# Patient Record
Sex: MALE | Race: WHITE | URBAN - METROPOLITAN AREA
[De-identification: names, ages, dates, MRNs, and addresses within clinical notes are randomized per-mention and may not be internally consistent; named-entity substitution may affect disease eponyms.]

---

## 2023-09-13 ENCOUNTER — OFFICE VISIT (OUTPATIENT)
Dept: SURGERY | Age: 58
End: 2023-09-13

## 2023-09-13 ENCOUNTER — HOSPITAL ENCOUNTER (OUTPATIENT)
Dept: ULTRASOUND IMAGING | Age: 58
Discharge: HOME | End: 2023-09-13
Payer: COMMERCIAL

## 2023-09-13 VITALS — BODY MASS INDEX: 32.26 KG/M2 | WEIGHT: 206 LBS

## 2023-09-13 DIAGNOSIS — N50.89 SWELLING OF RIGHT TESTICLE: Primary | ICD-10-CM

## 2023-09-13 DIAGNOSIS — N50.89 OTHER SPECIFIED DISORDERS OF THE MALE GENITAL ORGANS: ICD-10-CM

## 2023-09-13 PROCEDURE — 76870 US EXAM SCROTUM: CPT

## 2023-09-13 ASSESSMENT — ENCOUNTER SYMPTOMS
CONSTIPATION: 0
ABDOMINAL PAIN: 0

## 2023-09-13 NOTE — PROGRESS NOTES
Subjective:      Patient ID: Ladell Castleman is a 59 y.o. male. HPI  Chief Complaint: scrotal swelling  Patient referred by self for evaluation of scrotal swelling. Patient reports symptoms of pain, swelling. Location of symptoms is right testicle. Denies groin pain, nausea, change in bowel habits. Symptoms were first noted a couple months after hernia repair in  but significantly worse over last 6 months. Alleviated by nothing. Symptoms aggravated by lifting at work. Previous evaluation includes none. Patient has a history of direct hernia repair right groin . Will plan following treatment: U/S scrotum, testicle. Past Medical History:   Diagnosis Date    Depression     Wears glasses        Past Surgical History:   Procedure Laterality Date    COLONOSCOPY         Current Outpatient Medications   Medication Sig Dispense Refill    oxyCODONE-acetaminophen (PERCOCET) 5-325 MG per tablet Take 1-2 tablets by mouth every 6 hours as needed for Pain (Patient not taking: Reported on 2023) 30 tablet 0     No current facility-administered medications for this visit. Prior to Admission medications    Medication Sig Start Date End Date Taking?  Authorizing Provider   oxyCODONE-acetaminophen (PERCOCET) 5-325 MG per tablet Take 1-2 tablets by mouth every 6 hours as needed for Pain  Patient not taking: Reported on 2023   Yolie Lopez MD         Allergies   Allergen Reactions    Amoxicillin Hives       Social History     Socioeconomic History    Marital status:      Spouse name: Not on file    Number of children: Not on file    Years of education: Not on file    Highest education level: Not on file   Occupational History    Not on file   Tobacco Use    Smoking status: Former     Types: Cigarettes     Quit date: 2010     Years since quittin.7    Smokeless tobacco: Never   Substance and Sexual Activity    Alcohol use: Yes     Comment: rare    Drug use: No    Sexual

## 2023-09-14 DIAGNOSIS — N43.3 RIGHT HYDROCELE: Primary | ICD-10-CM
